# Patient Record
Sex: MALE | Race: WHITE | NOT HISPANIC OR LATINO | URBAN - NONMETROPOLITAN AREA
[De-identification: names, ages, dates, MRNs, and addresses within clinical notes are randomized per-mention and may not be internally consistent; named-entity substitution may affect disease eponyms.]

---

## 2019-02-22 ENCOUNTER — IMPORTED ENCOUNTER (OUTPATIENT)
Dept: URBAN - NONMETROPOLITAN AREA CLINIC 1 | Facility: CLINIC | Age: 69
End: 2019-02-22

## 2019-02-22 PROBLEM — Z96.1: Noted: 2019-02-22

## 2019-02-22 PROBLEM — H04.123: Noted: 2019-02-22

## 2019-02-22 PROBLEM — H10.423: Noted: 2019-02-22

## 2019-02-22 PROBLEM — H40.033: Noted: 2019-02-22

## 2019-02-22 PROCEDURE — 92014 COMPRE OPH EXAM EST PT 1/>: CPT

## 2019-02-22 NOTE — PATIENT DISCUSSION
JOSH OU:Discussed diagnosis in detail with patient. Signs/symptoms associated discussed. 1+SPK noted OU Patient d/c Restasis due to cost.Recommend Thera Tears BID-QID samples given today. Punctal plugs present OD today. Continue to monitor. Ocular allergies OU mild:Educated patient on condition. Asymptomatic at this time. Continue Alrex OU BID PRN. Continue to monitor. Narrow Angles OU:Educated patient on condition. s/p Yag PI OU patent iridotomy noted todayIOP stable today. Continue to monitor. P/C IOL with TMF OU:Discussed diagnosis in detail with patient. Both intraocular implants in place and stable. Open Capsules noted OU. Continue to monitor.; Dr's Notes: TMF OU:  If still complaining of vision after Yag PC OU - can give updated Rx for when riding motorcycle - recommend polycarbonate lenses.  Quick MR 2/19/2018+0.25-0.50x095 20/20-0.25-0.75x105 20/20

## 2020-02-24 ENCOUNTER — IMPORTED ENCOUNTER (OUTPATIENT)
Dept: URBAN - NONMETROPOLITAN AREA CLINIC 1 | Facility: CLINIC | Age: 70
End: 2020-02-24

## 2020-02-24 PROBLEM — H52.4: Noted: 2020-02-24

## 2020-02-24 PROBLEM — H04.123: Noted: 2019-02-22

## 2020-02-24 PROBLEM — Z96.1: Noted: 2019-02-22

## 2020-02-24 PROBLEM — H40.033: Noted: 2019-02-22

## 2020-02-24 PROBLEM — H10.423: Noted: 2019-02-22

## 2020-02-24 PROCEDURE — 92014 COMPRE OPH EXAM EST PT 1/>: CPT

## 2020-02-24 NOTE — PATIENT DISCUSSION
JOSH OU:Discussed diagnosis in detail with patient. Signs/symptoms associated discussed. Patient d/c Restasis due to cost.Continue Thera Tears BID-QIDNo Punctal plugs present OUContinue to monitor. Ocular allergies OU mild:Educated patient on condition. Asymptomatic at this time. Continue Alrex OU BID PRN. Continue to monitor. Narrow Angles OU:Educated patient on condition. s/p Yag PI OU patent iridotomy noted todayIOP stable today. Continue to monitor. P/C IOL with TMF OU:Discussed diagnosis in detail with patient. Both intraocular implants in place and stable. Open Capsules noted OU. Continue to monitor. Presbyopia OUDiscussed refractive status in detail with patient. New glasses Rx given today. Continue to monitor.; 's Notes: TMF OU:  If still complaining of vision after Yag PC OU - can give updated Rx for when riding motorcycle - recommend polycarbonate lenses.  Quick MR 2/19/2018+0.25-0.50x095 20/20-0.25-0.75x105 20/20

## 2022-04-09 ASSESSMENT — VISUAL ACUITY
OD_PH: 20/30
OD_CC: 20/50-
OS_CC: 20/30-2
OS_CC: 20/25-
OD_CC: 20/40-
OD_PH: 20/40

## 2022-04-09 ASSESSMENT — TONOMETRY
OD_IOP_MMHG: 14
OS_IOP_MMHG: 14
OD_IOP_MMHG: 14
OS_IOP_MMHG: 14